# Patient Record
Sex: MALE | Race: WHITE
[De-identification: names, ages, dates, MRNs, and addresses within clinical notes are randomized per-mention and may not be internally consistent; named-entity substitution may affect disease eponyms.]

---

## 2023-01-19 ENCOUNTER — HOSPITAL ENCOUNTER (EMERGENCY)
Dept: HOSPITAL 75 - ER | Age: 28
Discharge: HOME | End: 2023-01-19
Payer: SELF-PAY

## 2023-01-19 VITALS — HEIGHT: 66.14 IN | WEIGHT: 194.01 LBS | BODY MASS INDEX: 31.18 KG/M2

## 2023-01-19 VITALS — SYSTOLIC BLOOD PRESSURE: 149 MMHG | DIASTOLIC BLOOD PRESSURE: 93 MMHG

## 2023-01-19 DIAGNOSIS — Z79.2: ICD-10-CM

## 2023-01-19 DIAGNOSIS — B36.9: ICD-10-CM

## 2023-01-19 DIAGNOSIS — K08.89: ICD-10-CM

## 2023-01-19 DIAGNOSIS — R07.81: Primary | ICD-10-CM

## 2023-01-19 DIAGNOSIS — G89.29: ICD-10-CM

## 2023-01-19 DIAGNOSIS — Z28.310: ICD-10-CM

## 2023-01-19 DIAGNOSIS — F17.290: ICD-10-CM

## 2023-01-19 PROCEDURE — 96372 THER/PROPH/DIAG INJ SC/IM: CPT

## 2023-01-19 PROCEDURE — 93005 ELECTROCARDIOGRAM TRACING: CPT

## 2023-01-19 PROCEDURE — 71046 X-RAY EXAM CHEST 2 VIEWS: CPT

## 2023-01-19 NOTE — ED CHEST PAIN
General


Chief Complaint:  Chest Pain


Stated Complaint:  CHEST PAINS


Nursing Triage Note:  


patient ambulates to ER w c/o chest pain that started this AM. Patient states 


the pain is all the way across his chest and it hurts to take a deep breath. 


Patient states the pain feels "kind of heavy i think."





History of Present Illness


Date Seen by Provider:  2023


Time Seen by Provider:  09:19


Initial Comments


Patient is a 27-year-old male who presents to the emergency room with a chief 

complaint of chest pain onset around 7:00 this morning just after he woke up.  

He at its worst rated it a "9".  Currently a "3".  He states the pain was 

constant, radiated across his lower chest wall.  Worse with taking a deep 

breath.  He denies cough, sputum.  No recent fevers, chills, URI symptoms.  He 

is a former smoker, started vaping about 3 months ago.  He was a little nauseous

at the height of the pain and "gagged" several times but "nothing came up".  He 

denies feeling lightheaded or dizzy.  He has had chronic tooth pain for the last

several days for which she is on antibiotics currently.  He took some Tylenol in

the car on the drive to the emergency room.  2 extra strength Tylenol.  He is 

not short of breath currently.  No diaphoresis at the onset of pain.





No chronic medical conditions such as hypertension, hypercholesterolemia or 

known coronary artery disease.  No family history of early coronary artery 

disease.  No recent travel, prolonged immobility or recent surgeries.





All other review of systems reviewed and negative except as stated.


Timing/Duration:  1-3 hours


Severity/Quality:  severe, aching


Location:  central (lower ribs bilaterall and slightly to the left of the 

sternum)


Radiation:  no radiation


Activities at Onset:  none


ASA po PTA:  No


NTG SL PTA:  No


Associated Symptoms:  nausea/vomiting





Allergies and Home Medications


Allergies


Coded Allergies:  


     No Known Drug Allergies (Unverified , 6/4/15)





Patient Home Medication List


Home Medication List Reviewed:  Yes


Cephalexin Monohydrate (Cephalexin) 500 Mg Capsule, 1 EACH PO TID


   Prescribed by: JOSEPH GAY on 6/5/15 0208


Hydrocodone Bit/Acetaminophen (Hydrocodone-Apap 5-325 Tablet) 1 Tab Tablet, 1 

TAB PO Q4H PRN for PAIN


   Prescribed by: JOSEPH GAY on 6/5/15 0208





Review of Systems


Review of Systems


Constitutional:  see HPI


EENTM:  Other (dental pain - lower molars bilaterally)


Respiratory:  No Symptoms Reported


Cardiovascular:  Chest Pain


Gastrointestinal:  Nausea


Genitourinary:  No Symptoms Reported


Musculoskeletal:  no symptoms reported


Skin:  no symptoms reported


Psychiatric/Neurological:  No Symptoms Reported





All Other Systems Reviewed


Negative Unless Noted:  Yes





Past Medical-Social-Family Hx


Patient Social History


Tobacco Use?:  No


Use of E-Cig and/or Vaping dev:  Yes


Substance use?:  No


Alcohol Use?:  Yes


Alcohol Frequency:  Once in a while





Immunizations Up To Date


Tetanus Booster (TDap):  Less than 5yrs





Family Medical History


No Pertinent Family Hx





Physical Exam


Vital Signs





Vital Signs - First Documented








 23





 09:11


 


Temp 35.1


 


Pulse 67


 


Resp 20


 


B/P (MAP) 137/85 (102)


 


Pulse Ox 96


 


O2 Delivery Room Air





Capillary Refill : Less Than 3 Seconds


Height, Weight, BMI


Height: 5'7"


Weight: 164lbs. oz. 74.214437ab; 31.00 BMI


Method:Stated


General Appearance:  No Apparent Distress, WD/WN


HEENT:  Pharynx Normal, Other (no gross, obvious dental issues; Mucous membranes

 moist)


Neck:  Normal Inspection


Respiratory:  Lungs Clear, Normal Breath Sounds, No Accessory Muscle Use, No 

Respiratory Distress


Cardiovascular:  Regular Rate, Rhythm, No Murmur, Normal Peripheral Pulses


Gastrointestinal:  Normal Bowel Sounds, Non Tender, Soft


Extremity:  Normal Capillary Refill, Normal Inspection, Normal Range of Motion, 

Non Tender, No Calf Tenderness


Neurologic/Psychiatric:  Alert, Oriented x3, No Motor/Sensory Deficits, Normal 

Mood/Affect


Skin:  Normal Color, Warm/Dry, Other (Patchy, macular erythematous rash in the 

right lower quadrant of the abdomen with satellite lesions.  Pruritic.  

Nonpainful.  No pustules, blisters)





Progress/Results/Core Measures


Results/Orders


My Orders





Orders - ZAIRA LEVY MD


Ekg Tracing (23 09:09)


Chest Pa/Lat (2 View) (23 09:27)


Ketorolac Injection (Toradol Injection) (23 09:30)





Medications Given in ED





Current Medications








 Medications  Dose


 Ordered  Sig/Karina


 Route  Start Time


 Stop Time Status Last Admin


Dose Admin


 


 Ketorolac


 Tromethamine  30 mg  ONCE  ONCE


 IM  23 09:30


 23 09:31 DC 23 09:49


30 MG








Vital Signs/I&O











 23





 09:11


 


Temp 35.1


 


Pulse 67


 


Resp 20


 


B/P (MAP) 137/85 (102)


 


Pulse Ox 96


 


O2 Delivery Room Air














Blood Pressure Mean:                    102











Progress


Progress Note :  


   Time:  10:30


Progress Note


Patient seen and examined, 27-year-old with pleuritic type chest pain.  

Evaluation today includes a physical exam, EKG and two-view chest x-ray.  P

aniceto's EKG is unremarkable for any acute abnormality.  His chest x-ray is 

reassuring/negative.  Vital signs remained stable..  History and physical exam 

do not support the need for blood work, advanced imaging.  He was treated in the

emergency department with Toradol, achieved some relief of symptoms.  Strongly 

encouraged him to consider quitting smoking/vaping.  He has no clinical or 

objective findings concerning for acute coronary syndrome, pulmonary embolism, 

pneumothorax, pneumonia, dissection.  Return precautions provided to the 

patient.  He verbalized understanding and all questions are sought and answered.

 Patient is stable for discharge.


Initial ECG Impression Date:  2023


Initial ECG Impression Time:  09:18


Initial ECG Rate:  81


Initial ECG Rhythm:  Normal Sinus


Initial ECG Intervals:  Normal


Initial ECG Impression:  Nonspecific Changes (Inferiorly)





Diagnostic Imaging





   Diagonstic Imaging:  Xray


   Plain Films/CT/US/NM/MRI:  chest


Comments


                 [facility] [place], [place]





NAME:   RENETTA CHIN


MED REC#:   [id number]


ACCOUNT#:   [id number]


PT STATUS:   REG ER


:   1995


PHYSICIAN:   ZAIRA LEVY MD


ADMIT DATE:   23/ER


                                   ***Draft***


Date of Exam:23





CHEST PA/LAT (2 VIEW)








EXAMINATION: 


Chest, 2 view.





HISTORY: 


Bilateral anterior chest pain.





COMPARISON: 


None available.





FINDINGS: 


Heart size and pulmonary vasculature are normal. The lungs are


clear without consolidation, pleural effusion, or pneumothorax.


The osseous structures are intact.





IMPRESSION: 


No acute radiographic abnormality in the chest.





  Dictated on workstation # [id number]








Dict:   23 0954


Trans:   23 1014


 [id number]





Interpreted by:     GALO MERRITT DO


Electronically signed by:


Counseling-Symptomatic:  3-10 Minutes


Follow-up with PCP to:  Discuss Further Options





Departure


Impression





   Primary Impression:  


   Pleuritic chest pain


   Additional Impression:  


   Fungal dermatitis


Disposition:  01 HOME, SELF-CARE


Condition:  Improved





Departure-Patient Inst.


Decision time for Depature:  10:31


Referrals:  


[facility] [place]





ELISA,LOCAL PHYSICIAN (PCP)


Primary Care Physician


Patient Instructions:  Pleuritic Chest Pain ED





Add. Discharge Instructions:  


Drink plenty of fluids to stay well-hydrated.





Take over-the-counter Aleve, 2 tablets which is 500 mg twice daily with food as 

needed for pain OR you can take generic ibuprofen (Advil/Motrin) 3 pills which 

is 600 mg every 6 hours with food for pain.





You should strongly consider stopping vaping.





If you develop a fever, worsening pain, productive cough/shortness of breath 

please return to the emergency room for reevaluation.





Use an over-the-counter antifungal cream such as TInactin on the rash on your 

abdomen.  Follow packaging instructions.





Please follow-up with your primary care provider.


Work/School Note:  Work Release Form   Date Seen in the Emergency Department:  

2023


   Return to Work:  2023











ZAIRA LEVY MD         2023 09:19

## 2023-01-19 NOTE — DIAGNOSTIC IMAGING REPORT
EXAMINATION: 

Chest, 2 view.



HISTORY: 

Bilateral anterior chest pain.



COMPARISON: 

None available.



FINDINGS: 

Heart size and pulmonary vasculature are normal. The lungs are

clear without consolidation, pleural effusion, or pneumothorax.

The osseous structures are intact.



IMPRESSION: 

No acute radiographic abnormality in the chest.



Dictated by: 



  Dictated on workstation # [id number]